# Patient Record
Sex: FEMALE | Race: WHITE | NOT HISPANIC OR LATINO | Employment: FULL TIME | URBAN - METROPOLITAN AREA
[De-identification: names, ages, dates, MRNs, and addresses within clinical notes are randomized per-mention and may not be internally consistent; named-entity substitution may affect disease eponyms.]

---

## 2019-12-26 ENCOUNTER — TRANSCRIBE ORDERS (OUTPATIENT)
Dept: NEUROSURGERY | Facility: CLINIC | Age: 54
End: 2019-12-26

## 2019-12-26 DIAGNOSIS — M54.50 LOWER BACK PAIN: Primary | ICD-10-CM

## 2020-01-02 ENCOUNTER — OFFICE VISIT (OUTPATIENT)
Dept: NEUROSURGERY | Facility: CLINIC | Age: 55
End: 2020-01-02
Payer: COMMERCIAL

## 2020-01-02 VITALS
BODY MASS INDEX: 39.05 KG/M2 | TEMPERATURE: 98.6 F | DIASTOLIC BLOOD PRESSURE: 71 MMHG | WEIGHT: 243 LBS | RESPIRATION RATE: 16 BRPM | SYSTOLIC BLOOD PRESSURE: 121 MMHG | HEIGHT: 66 IN | HEART RATE: 59 BPM

## 2020-01-02 DIAGNOSIS — M54.50 LOWER BACK PAIN: ICD-10-CM

## 2020-01-02 PROCEDURE — 99203 OFFICE O/P NEW LOW 30 MIN: CPT | Performed by: PHYSICIAN ASSISTANT

## 2020-01-02 RX ORDER — PREDNISONE 10 MG/1
TABLET ORAL
COMMUNITY
Start: 2019-12-26

## 2020-01-02 RX ORDER — METAXALONE 800 MG/1
800 TABLET ORAL 3 TIMES DAILY
COMMUNITY
Start: 2019-12-24

## 2020-01-02 RX ORDER — TRAMADOL HYDROCHLORIDE 50 MG/1
TABLET ORAL
COMMUNITY
Start: 2019-12-26

## 2020-01-02 NOTE — PATIENT INSTRUCTIONS
· At this time, we do not recommend any neurosurgical intervention  · We recommend that you continue conservative management with pain management and consideration for physical therapy  · Continue follow up with your primary care provider for general health and we recommend healthy choices  · No follow up is required by us, you can follow up with us as needed  · Please contact us with any questions or concerns

## 2020-01-02 NOTE — PROGRESS NOTES
Assessment/Plan:     Diagnoses and all orders for this visit:    Lower back pain  -     Ambulatory referral to Neurosurgery      Pt presents to the neurosurgery office  for consultation for right sided low back pain for about 3 weeks since 12/14/19 that has since decreased in intensity  Pt had an MRI Lumbar spine at [facility]  She came with discs of the MRI  · Exam: GCS 15, AAO X 3, GREGORIO, strength 5/5 BUE/BLE, sensation intact to LT/PP X 4, Reflexes 2+ and symmetric, no barone's or clonus, no drift bilaterally  · Imaging reviewed personally with attending  Final results below discussed with the patient  · MRI  Lumbar spine done at [facility] 12/30/19 (Would not load in the PACS system, viewed on the laptop) showed mild degenerative changes without central canal stenosis  Plan  · Pain control with OTC/ prescribed pain medications as needed  · MRI of the lumbar spine showed mild degenerative changes without central canal stenosis  At this time, no neurosurgical intervention is anticipated  Recommend pt continue conservative  management with pain medication and physical therapy is needed  Pt seems to be getting some improvement with conservative management  No follow up is required by neurosurgery at this time, pt could follow up PRN  · Discussed recommendations with patient  Patient is agreeable  · Patient made aware to contact neurosurgery with any questions or concerns  Subjective:      Patient ID: Davi Olivas is a 53 y o  female  HPI    Ms Christiano Catherine is a 52 yo female with PMHx of CAD s/p 2 cardiac stents and takes ASA/ Plavix who presents to the neurosurgery office  for consultation for right sided low back pain for about 3 weeks since 12/14/19  Pt had an MRI of the Lumbar spine done at [facility]  Pt brought the MRI discs today  Pt reports she continues to have right sided low back pain  Pt rates her back pain as 3/10 on the pain scale today   Pt reports initially her pain was " 20/10" on the pain scale but it has improved over the last few weeks  Pt describes their pain now as dull/achy  Pt reports the pain was sharp when it initially began  Pt reports the pain is intermittent at this time but it was constant before  Pt reports the pain radiates down the right buttock and right hip but not down the rest of the leg  Pt denies any numbness or tingling  Pt reports some associated weakness on right leg  Pt denies bowel or bladder incontinence  Pt report she is seeing physical therapy  Pt had her PCP order for her Metaxalone, Prednisone taper which she completed and Tramadol which she reports provided some pain relief  Pt reports she is also scheduled to see pain management on 1/24/20  PMH  Allergies: Brillinta-  Gets SOB and morphine - gets SOB  Past Surgeries: Appendectomy, hysterectomy, 2 cardiac stents: 1 in 2017 and the other in 2018  SH  Family: Pt is  and has an adopted daughter aged 23 years  Pt's  came with pt to the office for this visit  Occupation: Pharmacy   Smoking: Smokes half a pack day  Drinking: Never    The following portions of the patient's history were reviewed and updated as appropriate: allergies, current medications, past family history, past medical history, past social history, past surgical history and problem list     Review of Systems   Constitutional: Positive for fatigue  HENT: Negative  Eyes: Negative  Respiratory: Negative  Cardiovascular: Negative  Gastrointestinal: Negative  Endocrine: Negative  Genitourinary: Negative  Musculoskeletal: Positive for back pain (right side to right buttock and hip area) and gait problem  Skin: Negative  Allergic/Immunologic: Negative  Neurological: Negative for weakness and numbness  Hematological: Negative  Psychiatric/Behavioral: Negative for sleep disturbance           Objective:      /71 (BP Location: Left arm, Patient Position: Sitting, Cuff Size: Standard)   Pulse 59   Temp 98 6 °F (37 °C) (Tympanic)   Resp 16   Ht 5' 6" (1 676 m)   Wt 110 kg (243 lb)   BMI 39 22 kg/m²          Physical Exam   Constitutional: She is oriented to person, place, and time  She appears well-developed and well-nourished  No distress  HENT:   Head: Normocephalic and atraumatic  Eyes: Pupils are equal, round, and reactive to light  EOM are normal    Neck: Normal range of motion  Neck supple  No tracheal deviation present  Cardiovascular: Normal rate  Pulmonary/Chest: Effort normal    Abdominal: Soft  There is no tenderness  There is no guarding  Musculoskeletal: She exhibits no edema  Neurological: She is alert and oriented to person, place, and time  GCS 15, AAO X 3, GREGORIO, strength 5/5 BUE/BLE, sensation intact to LT/PP X 4, Reflexes 2+ and symmetric, no barone's or clonus, no drift bilaterally  Skin: Skin is warm and dry  No rash noted  No pallor  Psychiatric: She has a normal mood and affect   Her behavior is normal